# Patient Record
Sex: FEMALE | Race: WHITE | NOT HISPANIC OR LATINO | Employment: OTHER | URBAN - METROPOLITAN AREA
[De-identification: names, ages, dates, MRNs, and addresses within clinical notes are randomized per-mention and may not be internally consistent; named-entity substitution may affect disease eponyms.]

---

## 2020-07-31 ENCOUNTER — NURSE TRIAGE (OUTPATIENT)
Dept: NURSING | Facility: CLINIC | Age: 75
End: 2020-07-31

## 2020-07-31 DIAGNOSIS — Z11.59 SCREENING FOR VIRAL DISEASE: Primary | ICD-10-CM

## 2020-07-31 NOTE — TELEPHONE ENCOUNTER
"Patient is calling requesting COVID serologic antibody testing.  NOTE: Serologic testing is a blood test for 'antibodies' which are made at 10-14 days after you have had symptoms of COVID or were exposed and had an asymptomatic infection.  This does NOT test you for 'active' infection or tell you if you are contagious.    Are you a healthcare worker? no  Do you currently have a cough, fever, body aches, shortness of breath, or difficulty breathing?  No  Did you previously have cough, fever, body aches, shortness of breath, or difficulty breathing that have now resolved? Has had previous covid symptoms.   Symptoms began 60 days ago.  Symptoms started > 14 days ago. Lab order placed per SARS-CoV-2 Serology test Standing Order using indication \"Previously symptomatic >14d since onset, currently asymptomatic\" and diagnosis code \"Screening for viral disease\" (Z11.59)      The patient was informed: \"Testing is limited each day and it may take time for testing to be available to everyone who has called. You will receive a call within 48-72 hours to schedule the serology testing. Please confirm the best number to reach you is [phone]. If you have any questions about scheduling, call [phone].\"   Yoselin Guidry RN      "

## 2020-08-06 DIAGNOSIS — Z11.59 SCREENING FOR VIRAL DISEASE: ICD-10-CM

## 2020-08-06 PROCEDURE — 86769 SARS-COV-2 COVID-19 ANTIBODY: CPT | Performed by: EMERGENCY MEDICINE

## 2020-08-06 PROCEDURE — 36415 COLL VENOUS BLD VENIPUNCTURE: CPT | Performed by: EMERGENCY MEDICINE

## 2020-08-11 LAB
COVID-19 SPIKE RBD ABY TITER: NORMAL
COVID-19 SPIKE RBD ABY: POSITIVE

## 2020-08-21 ENCOUNTER — NURSE TRIAGE (OUTPATIENT)
Dept: NURSING | Facility: CLINIC | Age: 75
End: 2020-08-21

## 2020-08-21 NOTE — TELEPHONE ENCOUNTER
Pt called in ask about the antibody test..  The question is answer. No other question at this time.      Chapin Castro Nurse Advisor 8/21/2020 1:50 PM

## 2022-04-11 ENCOUNTER — OFFICE VISIT (OUTPATIENT)
Dept: URGENT CARE | Facility: URGENT CARE | Age: 77
End: 2022-04-11
Payer: MEDICARE

## 2022-04-11 ENCOUNTER — ANCILLARY PROCEDURE (OUTPATIENT)
Dept: GENERAL RADIOLOGY | Facility: CLINIC | Age: 77
End: 2022-04-11
Attending: PHYSICIAN ASSISTANT
Payer: MEDICARE

## 2022-04-11 VITALS
HEIGHT: 68 IN | BODY MASS INDEX: 19.7 KG/M2 | OXYGEN SATURATION: 97 % | DIASTOLIC BLOOD PRESSURE: 78 MMHG | RESPIRATION RATE: 15 BRPM | HEART RATE: 76 BPM | SYSTOLIC BLOOD PRESSURE: 120 MMHG | WEIGHT: 130 LBS | TEMPERATURE: 98.2 F
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
## 2022-04-11 DIAGNOSIS — S63.501A WRIST SPRAIN, RIGHT, INITIAL ENCOUNTER: ICD-10-CM

## 2022-04-11 DIAGNOSIS — S01.81XA FACIAL LACERATION, INITIAL ENCOUNTER: ICD-10-CM

## 2022-04-11 PROCEDURE — 90715 TDAP VACCINE 7 YRS/> IM: CPT | Performed by: PHYSICIAN ASSISTANT

## 2022-04-11 PROCEDURE — 73130 X-RAY EXAM OF HAND: CPT | Mod: RT | Performed by: RADIOLOGY

## 2022-04-11 PROCEDURE — 12011 RPR F/E/E/N/L/M 2.5 CM/<: CPT | Performed by: PHYSICIAN ASSISTANT

## 2022-04-11 PROCEDURE — 90471 IMMUNIZATION ADMIN: CPT | Performed by: PHYSICIAN ASSISTANT

## 2022-04-11 PROCEDURE — 99203 OFFICE O/P NEW LOW 30 MIN: CPT | Mod: 25 | Performed by: PHYSICIAN ASSISTANT

## 2022-04-11 NOTE — PROGRESS NOTES
SUBJECTIVE:   Jaquelin Frias is a 76 year old female presenting with a chief complaint of   Chief Complaint   Patient presents with     Urgent Care     Laceration     Fell this morning around 10am, injury to right hand and cut to right side above eye.        She is a new patient of [facility].  Patient presents with lac to right eye brown and right hand pain after tripping over a primo and hitting the cement at 10 am.  Patient believes her glasses cut her eye brow.  No LOC.  Last Tetnus was 2010.  Hx of osteopenia    Patient took ibuprofen PTA.      Laceration    Mechanism of injury: Patient  History provided by: Patient  Time of injury was 3 hours(s) ago.    This is a non-work related injury.    Associated symptoms: Denies numbness, weakness, or loss of function    Last tetanus booster within 10 years: No    LACERATION EXAM:   Size of laceration: 1.5 centimeters  Characteristics of the laceration: clean  Depth of laceration: superficial  Tendon function intact: Yes  Sensation to light touch intact: Yes  Pulses/capillary refill intact: Yes  Foreign body: No    Picture included in patient's chart: no    PROCEDURE NOTE:  Anesthesia: dermabond  Prepped and draped in the usual sterile fashion  Wound irrigated with sterile water  Wound was explored for any foreign bodies and evaluated for tendon, nerve, vessel or joint involvement.    Closure was dermabond  Laceration was closed with Dermabond  Bandage was applied  Patient tolerated the procedure well      MS Injury/Pain    Onset of symptoms was 3 hour(s) ago.  Location: right wrist  Context:       The injury happened while at home      Mechanism: fall       Patient experienced immediate pain, immediate swelling  Course of symptoms is same.    Severity moderate  Current and Associated symptoms: Pain, Swelling and Bruising  Aggravating Factors: movement  Therapies to improve symptoms include: ibuprofen  This is the first time this type of problem has occurred for this  "patient.   RHD    Review of Systems    History reviewed. No pertinent past medical history.  History reviewed. No pertinent family history.  Current Outpatient Medications   Medication Sig Dispense Refill     ALENDRONATE SODIUM PO Take  by mouth every 7 days.       antipyrine-benzocaine (AURODEX) 54-14 MG/ML SOLN Place 4 drops in ear(s) 4 times daily as needed. 15 mL 0     azithromycin (ZITHROMAX) 250 MG tablet Two tablets first day, then one tablet daily for four days   6 tablet 0     CALCIUM PO Take  by mouth daily.       Multiple Vitamins-Minerals (MULTIVITAMIN & MINERAL PO) Take  by mouth daily.       Social History     Tobacco Use     Smoking status: Never Smoker     Smokeless tobacco: Never Used   Substance Use Topics     Alcohol use: Not on file       OBJECTIVE  /78   Pulse 76   Temp 98.2  F (36.8  C) (Temporal)   Resp 15   Ht 1.727 m (5' 8\")   Wt 59 kg (130 lb)   SpO2 97%   BMI 19.77 kg/m      Physical Exam  Vitals and nursing note reviewed.   Constitutional:       Appearance: Normal appearance. She is normal weight.   Eyes:      Extraocular Movements: Extraocular movements intact.      Conjunctiva/sclera: Conjunctivae normal.   Cardiovascular:      Rate and Rhythm: Normal rate.   Musculoskeletal:      Comments: Right hand with normal ROM.  Right wrist:  Negative snuff box, good pulses, Pain with ROM - all.   Skin:     Comments: Right in eye brow 1.5 cm linear, superficial.    Right hand thenar eminence with minor ecchymosis.   Neurological:      Mental Status: She is alert.         Labs:  No results found for this or any previous visit (from the past 24 hour(s)).    X-Ray was done, my findings are: No fx.    ASSESSMENT:      ICD-10-CM    1. Fall, initial encounter  W19.XXXA XR Wrist Right G/E 3 Views     XR Hand Right G/E 3 Views   2. Facial laceration, initial encounter  S01.81XA    3. Wrist sprain, right, initial encounter  S63.501A Wrist/Arm/Hand Supplies Order for DME - ONLY FOR DME    "     Medical Decision Making:    Differential Diagnosis:  MS Injury Pain: sprain, fracture and contusion    Serious Comorbid Conditions:  Adult:  reviewed    PLAN:    Laceration:    Keep wound clean and dry for the next 24-48 hours  Ok to shower and get wound wet after 48 hours, but do not soak for 2 weeks  Wound follow-up: as needed  May return to work/school as long as wound is kept clean and dry  Discussed the probability of scarring  Active range of motion exercises encouraged for finger lacerations    Patient will return immediately if they experience redness around the laceration, drainage, worsening pain, or fever.      MS Injury/Pain  ice, Tylenol and Ibuprofen.  Tdap updated.  Patient placed in a wrist brace.  Will call with any xray discrepancies (PAX is currently down).      Followup:    If not improving or if condition worsens, follow up with your Primary Care Provider, If not improving or if conditions worsens over the next 12-24 hours, go to the Emergency Department    There are no Patient Instructions on file for this visit.

## 2022-06-05 ENCOUNTER — HEALTH MAINTENANCE LETTER (OUTPATIENT)
Age: 77
End: 2022-06-05

## 2022-10-15 ENCOUNTER — HEALTH MAINTENANCE LETTER (OUTPATIENT)
Age: 77
End: 2022-10-15

## 2023-06-11 ENCOUNTER — HEALTH MAINTENANCE LETTER (OUTPATIENT)
Age: 78
End: 2023-06-11

## 2024-08-04 ENCOUNTER — HEALTH MAINTENANCE LETTER (OUTPATIENT)
Age: 79
End: 2024-08-04